# Patient Record
Sex: FEMALE | Race: WHITE | URBAN - METROPOLITAN AREA
[De-identification: names, ages, dates, MRNs, and addresses within clinical notes are randomized per-mention and may not be internally consistent; named-entity substitution may affect disease eponyms.]

---

## 2017-05-23 ENCOUNTER — APPOINTMENT (OUTPATIENT)
Age: 47
Setting detail: DERMATOLOGY
End: 2017-05-24

## 2017-05-23 VITALS — HEIGHT: 64 IN | WEIGHT: 270 LBS

## 2017-05-23 DIAGNOSIS — D485 NEOPLASM OF UNCERTAIN BEHAVIOR OF SKIN: ICD-10-CM

## 2017-05-23 DIAGNOSIS — L57.8 OTHER SKIN CHANGES DUE TO CHRONIC EXPOSURE TO NONIONIZING RADIATION: ICD-10-CM

## 2017-05-23 DIAGNOSIS — D22 MELANOCYTIC NEVI: ICD-10-CM

## 2017-05-23 PROBLEM — D22.5 MELANOCYTIC NEVI OF TRUNK: Status: ACTIVE | Noted: 2017-05-23

## 2017-05-23 PROBLEM — D22.62 MELANOCYTIC NEVI OF LEFT UPPER LIMB, INCLUDING SHOULDER: Status: ACTIVE | Noted: 2017-05-23

## 2017-05-23 PROBLEM — L20.84 INTRINSIC (ALLERGIC) ECZEMA: Status: ACTIVE | Noted: 2017-05-23

## 2017-05-23 PROBLEM — L55.1 SUNBURN OF SECOND DEGREE: Status: ACTIVE | Noted: 2017-05-23

## 2017-05-23 PROBLEM — D22.61 MELANOCYTIC NEVI OF RIGHT UPPER LIMB, INCLUDING SHOULDER: Status: ACTIVE | Noted: 2017-05-23

## 2017-05-23 PROBLEM — D22.39 MELANOCYTIC NEVI OF OTHER PARTS OF FACE: Status: ACTIVE | Noted: 2017-05-23

## 2017-05-23 PROBLEM — D48.5 NEOPLASM OF UNCERTAIN BEHAVIOR OF SKIN: Status: ACTIVE | Noted: 2017-05-23

## 2017-05-23 PROCEDURE — OTHER MIPS QUALITY: OTHER

## 2017-05-23 PROCEDURE — OTHER COUNSELING: OTHER

## 2017-05-23 PROCEDURE — OTHER FOLLOW UP FOR NEXT VISIT: OTHER

## 2017-05-23 PROCEDURE — 11301 SHAVE SKIN LESION 0.6-1.0 CM: CPT

## 2017-05-23 PROCEDURE — OTHER REASSURANCE: OTHER

## 2017-05-23 PROCEDURE — 99203 OFFICE O/P NEW LOW 30 MIN: CPT | Mod: 25

## 2017-05-23 PROCEDURE — OTHER SHAVE REMOVAL AND DESTRUCTION: OTHER

## 2017-05-23 PROCEDURE — OTHER TREATMENT REGIMEN: OTHER

## 2017-05-23 ASSESSMENT — LOCATION SIMPLE DESCRIPTION DERM
LOCATION SIMPLE: LOWER BACK
LOCATION SIMPLE: RIGHT UPPER BACK
LOCATION SIMPLE: RIGHT CHEEK
LOCATION SIMPLE: LEFT CHEEK
LOCATION SIMPLE: RIGHT SHOULDER
LOCATION SIMPLE: LEFT FOREARM
LOCATION SIMPLE: RIGHT FOREARM
LOCATION SIMPLE: LEFT SHOULDER

## 2017-05-23 ASSESSMENT — LOCATION DETAILED DESCRIPTION DERM
LOCATION DETAILED: LEFT PROXIMAL DORSAL FOREARM
LOCATION DETAILED: RIGHT POSTERIOR SHOULDER
LOCATION DETAILED: RIGHT CENTRAL MALAR CHEEK
LOCATION DETAILED: LEFT MEDIAL MALAR CHEEK
LOCATION DETAILED: RIGHT PROXIMAL DORSAL FOREARM
LOCATION DETAILED: RIGHT MEDIAL UPPER BACK
LOCATION DETAILED: LEFT POSTERIOR SHOULDER
LOCATION DETAILED: SUPERIOR LUMBAR SPINE

## 2017-05-23 ASSESSMENT — PAIN INTENSITY VAS: HOW INTENSE IS YOUR PAIN 0 BEING NO PAIN, 10 BEING THE MOST SEVERE PAIN POSSIBLE?: 4/10 PAIN

## 2017-05-23 ASSESSMENT — LOCATION ZONE DERM
LOCATION ZONE: ARM
LOCATION ZONE: FACE
LOCATION ZONE: TRUNK

## 2017-05-23 NOTE — PROCEDURE: SHAVE REMOVAL AND DESTRUCTION
Wound Care: Polysporin ointment
Path Notes (To The Dermatopathologist): Irritated benign nevus versus possible basal cell carcinoma, please evaluate
Anesthesia Volume In Cc: 0.6
Consent: Written consent was obtained and risks were reviewed including but not limited to scarring, infection, bleeding, scabbing, incomplete removal, nerve damage and allergy to anesthesia.
Hemostasis: Electrocautery
Post-Care Instructions: I reviewed with the patient in detail post-care instructions. Patient is to keep the biopsy site dry overnight, and then apply bacitracin twice daily until healed. Patient may apply hydrogen peroxide soaks to remove any crusting.
Dressing: dry sterile dressing
Bill For Surgical Tray: no
Notification Instructions: Patient will be notified of biopsy results. However, patient instructed to call the office if not contacted within 1 weeks.
Number Of Curettages: 2
Anesthesia Type: 1% lidocaine with epinephrine
Render Post-Care Instructions In Note?: yes
Cautery Type: electrodesiccation
Billing Type: Third-Party Bill
Detail Level: Detailed

## 2017-05-23 NOTE — PROCEDURE: REASSURANCE
Include Location In Plan?: Yes
Detail Level: Zone
Additional Note: Normal basic skin exam otherwise today no other suspicious lesions or abnormal moles noted on today's exam. Patient will need to follow up annually for full body skin exams
Additional Note: Normal basic skin exam today, no evidence of any pre-cancers or skin cancers. Follow up annually for full body skin exam

## 2017-05-23 NOTE — PROCEDURE: TREATMENT REGIMEN
Detail Level: Detailed
Plan: This likely represents the very irritated benign nevus however, there is a possibility that this could represent a skin cancer. If it's benign no further treatment or follow up will be needed for this lesion however if there is any evidence of any malignancy further treatment maybe need

## 2017-05-23 NOTE — PROCEDURE: MIPS QUALITY
Quality 110: Preventive Care And Screening: Influenza Immunization: Influenza Immunization previously received during influenza season
Quality 111:Pneumonia Vaccination Status For Older Adults: Pneumococcal Vaccination not Administered or Previously Received, Reason not Otherwise Specified
Quality 47: Advance Care Plan: Advance Care Planning discussed and documented in the medical record; patient did not wish or was not able to name a surrogate decision maker or provide an advance care plan.
Detail Level: Detailed

## 2019-05-24 ENCOUNTER — APPOINTMENT (OUTPATIENT)
Age: 49
Setting detail: DERMATOLOGY
End: 2019-05-24

## 2019-05-24 VITALS — HEIGHT: 64 IN

## 2019-05-24 DIAGNOSIS — L30.9 DERMATITIS, UNSPECIFIED: ICD-10-CM

## 2019-05-24 PROCEDURE — OTHER MIPS QUALITY: OTHER

## 2019-05-24 PROCEDURE — OTHER TREATMENT REGIMEN: OTHER

## 2019-05-24 PROCEDURE — OTHER COUNSELING: OTHER

## 2019-05-24 PROCEDURE — OTHER FOLLOW UP FOR NEXT VISIT: OTHER

## 2019-05-24 PROCEDURE — 99213 OFFICE O/P EST LOW 20 MIN: CPT

## 2019-05-24 ASSESSMENT — LOCATION SIMPLE DESCRIPTION DERM
LOCATION SIMPLE: LEFT CHEEK
LOCATION SIMPLE: RIGHT CHEEK
LOCATION SIMPLE: CHIN

## 2019-05-24 ASSESSMENT — PAIN INTENSITY VAS: HOW INTENSE IS YOUR PAIN 0 BEING NO PAIN, 10 BEING THE MOST SEVERE PAIN POSSIBLE?: 2/10 PAIN

## 2019-05-24 ASSESSMENT — SEVERITY ASSESSMENT: SEVERITY: MILD TO MODERATE

## 2019-05-24 ASSESSMENT — LOCATION DETAILED DESCRIPTION DERM
LOCATION DETAILED: LEFT CHIN
LOCATION DETAILED: RIGHT CENTRAL BUCCAL CHEEK
LOCATION DETAILED: LEFT CENTRAL BUCCAL CHEEK

## 2019-05-24 ASSESSMENT — BSA RASH: BSA RASH: 1

## 2019-05-24 ASSESSMENT — LOCATION ZONE DERM: LOCATION ZONE: FACE

## 2019-05-24 NOTE — PROCEDURE: FOLLOW UP FOR NEXT VISIT
Detail Level: Simple
Scheduled For Follow Up In (Optional): 2 weeks
Instructions (Optional): If symptoms worsen or do not improve

## 2019-05-24 NOTE — HPI: RASH
What Type Of Note Output Would You Prefer (Optional)?: Standard Output
How Severe Is Your Rash?: mild
Is This A New Presentation, Or A Follow-Up?: Rash
Additional History: Pt states the rash comes up when she gets in the sun, it becomes extremely itchy. She is going to the beach soon and would like this cured before.  Essentially patient will have redness, irritation, burning, and pain when she is in the sun for any extended time but only on the lower face in the chin area. She has no previous history of this. No new topical products that seem to be irritating. She does use daily sunscreen with SPF 50. No other skin surfaces are affected

## 2019-05-24 NOTE — PROCEDURE: TREATMENT REGIMEN
Samples Given: Bryhali lotion x2
Plan: Essentially patient has mild to moderate confluent erythema. No papules, no pustules, no vesicles. This is likely some form of reactive dermatitis. I don’t think this is contact related based on anything she is using. She will continue using the sunscreen. She will use the topical steroid once daily for 3 to 5 days then check in with us regarding her progress. If this works but she continues to need the topical steroid I would have the patient come in to consider a sample of Eucrisa or Soolantra. This may represent some unusual form of rosacea
Detail Level: Simple

## 2022-03-21 ENCOUNTER — APPOINTMENT (OUTPATIENT)
Age: 52
Setting detail: DERMATOLOGY
End: 2022-03-22

## 2022-03-21 DIAGNOSIS — L30.9 DERMATITIS, UNSPECIFIED: ICD-10-CM

## 2022-03-21 DIAGNOSIS — D22 MELANOCYTIC NEVI: ICD-10-CM

## 2022-03-21 DIAGNOSIS — D18.0 HEMANGIOMA: ICD-10-CM

## 2022-03-21 DIAGNOSIS — L57.8 OTHER SKIN CHANGES DUE TO CHRONIC EXPOSURE TO NONIONIZING RADIATION: ICD-10-CM

## 2022-03-21 DIAGNOSIS — B35.1 TINEA UNGUIUM: ICD-10-CM

## 2022-03-21 PROBLEM — D22.5 MELANOCYTIC NEVI OF TRUNK: Status: ACTIVE | Noted: 2022-03-21

## 2022-03-21 PROBLEM — D18.01 HEMANGIOMA OF SKIN AND SUBCUTANEOUS TISSUE: Status: ACTIVE | Noted: 2022-03-21

## 2022-03-21 PROBLEM — I10 ESSENTIAL (PRIMARY) HYPERTENSION: Status: ACTIVE | Noted: 2022-03-21

## 2022-03-21 PROCEDURE — OTHER PRESCRIPTION: OTHER

## 2022-03-21 PROCEDURE — OTHER COUNSELING: OTHER

## 2022-03-21 PROCEDURE — 99213 OFFICE O/P EST LOW 20 MIN: CPT

## 2022-03-21 PROCEDURE — OTHER REASSURANCE: OTHER

## 2022-03-21 PROCEDURE — OTHER FOLLOW UP FOR NEXT VISIT: OTHER

## 2022-03-21 PROCEDURE — OTHER TREATMENT REGIMEN: OTHER

## 2022-03-21 PROCEDURE — OTHER SUNSCREEN RECOMMENDATIONS: OTHER

## 2022-03-21 RX ORDER — EFINACONAZOLE 100 MG/ML
THIN COAT SOLUTION TOPICAL DAILY
Qty: 8 | Refills: 3 | Status: ERX | COMMUNITY
Start: 2022-03-21

## 2022-03-21 RX ORDER — CLOBETASOL PROPIONATE 0.5 MG/G
THIN COAT CREAM TOPICAL PRN
Qty: 30 | Refills: 1 | Status: ERX | COMMUNITY
Start: 2022-03-21

## 2022-03-21 ASSESSMENT — LOCATION SIMPLE DESCRIPTION DERM
LOCATION SIMPLE: RIGHT ANKLE
LOCATION SIMPLE: LEFT ANKLE
LOCATION SIMPLE: RIGHT GREAT TOE
LOCATION SIMPLE: LEFT GREAT TOE
LOCATION SIMPLE: LEFT HAND
LOCATION SIMPLE: CHEST
LOCATION SIMPLE: RIGHT 2ND TOE
LOCATION SIMPLE: RIGHT HAND
LOCATION SIMPLE: UPPER BACK
LOCATION SIMPLE: LOWER BACK
LOCATION SIMPLE: ABDOMEN
LOCATION SIMPLE: LEFT FOREHEAD
LOCATION SIMPLE: TRAPEZIAL NECK
LOCATION SIMPLE: LEFT 2ND TOE

## 2022-03-21 ASSESSMENT — LOCATION DETAILED DESCRIPTION DERM
LOCATION DETAILED: LEFT GREAT TOENAIL
LOCATION DETAILED: MIDDLE STERNUM
LOCATION DETAILED: RIGHT 2ND TOENAIL
LOCATION DETAILED: LEFT ANKLE
LOCATION DETAILED: MID TRAPEZIAL NECK
LOCATION DETAILED: LEFT INFERIOR MEDIAL FOREHEAD
LOCATION DETAILED: RIGHT ULNAR DORSAL HAND
LOCATION DETAILED: RIGHT ANKLE
LOCATION DETAILED: LEFT RADIAL DORSAL HAND
LOCATION DETAILED: SUPERIOR THORACIC SPINE
LOCATION DETAILED: RIGHT GREAT TOENAIL
LOCATION DETAILED: PERIUMBILICAL SKIN
LOCATION DETAILED: INFERIOR LUMBAR SPINE
LOCATION DETAILED: LEFT 2ND TOENAIL

## 2022-03-21 ASSESSMENT — SEVERITY ASSESSMENT: SEVERITY: MILD

## 2022-03-21 ASSESSMENT — PAIN INTENSITY VAS: HOW INTENSE IS YOUR PAIN 0 BEING NO PAIN, 10 BEING THE MOST SEVERE PAIN POSSIBLE?: NO PAIN

## 2022-03-21 ASSESSMENT — NAIL INVOLVEMENT PERCENT: % OF NAIL(S) INVOLVED WITH INFECTION: 20

## 2022-03-21 ASSESSMENT — LOCATION ZONE DERM
LOCATION ZONE: FACE
LOCATION ZONE: TOENAIL
LOCATION ZONE: TRUNK
LOCATION ZONE: HAND
LOCATION ZONE: NECK
LOCATION ZONE: LEG

## 2022-03-21 ASSESSMENT — ITCH NUMERIC RATING SCALE: HOW SEVERE IS YOUR ITCHING?: 2

## 2022-03-21 ASSESSMENT — BSA RASH: BSA RASH: 2

## 2022-03-21 NOTE — PROCEDURE: REASSURANCE
Hide Additional Notes?: No
Additional Note: Patient reassured lesions are benign and no treatment needed. Follow up if lesions change or become painful, otherwise follow up annually.
Detail Level: Zone
Include Location In Plan?: Yes
Additional Note: Normal fbse, patient reassured no abnormal moles or suspicious lesions. Follow up if lesions change or become painful, otherwise follow up annually.
Detail Level: Generalized
Additional Note: Normal fbse, patient counseled regarding sunscreen and how to use properly. Follow up if areas change or become painful, otherwise follow up annually.

## 2022-03-21 NOTE — HPI: SKIN LESION
What Type Of Note Output Would You Prefer (Optional)?: Bullet Format
How Severe Is Your Skin Lesion?: moderate
Has Your Skin Lesion Been Treated?: not been treated
Is This A New Presentation, Or A Follow-Up?: Skin Lesion
Additional History: Patient is here for TBE. She has a few spots she would specifically like evaluated on her chest.

## 2022-03-21 NOTE — PROCEDURE: TREATMENT REGIMEN
Initiate Treatment: Clobetasol
Plan: This appears consistent with some form of irritation or allergic dermatitis. Hopefully the topical steroid as needed will get this under control. If not she will call or follow up for further recommendations. Apply clobetasol as needed. Follow up if no improvement,ent, otherwise follow up annually.
Detail Level: Simple
Plan: Apply Jublia as directed. Follow up annually.  We discussed various treatment options, topical medicine versus oral medication. She would like to try a new topical prescription. If this is not covered we can consider other treatment options. Follow up in six months to one year
Initiate Treatment: Jublia

## 2022-10-04 RX ORDER — EFINACONAZOLE 100 MG/ML
THIN COAT SOLUTION TOPICAL DAILY
Qty: 8 | Refills: 3 | Status: ERX

## 2022-10-05 RX ORDER — CLOBETASOL PROPIONATE 0.5 MG/G
THIN COAT CREAM TOPICAL PRN
Qty: 30 | Refills: 1 | Status: ERX